# Patient Record
Sex: MALE | Race: WHITE | NOT HISPANIC OR LATINO | Employment: OTHER | URBAN - NONMETROPOLITAN AREA
[De-identification: names, ages, dates, MRNs, and addresses within clinical notes are randomized per-mention and may not be internally consistent; named-entity substitution may affect disease eponyms.]

---

## 2022-12-08 ENCOUNTER — OFFICE VISIT (OUTPATIENT)
Dept: CARDIAC SURGERY | Facility: CLINIC | Age: 62
End: 2022-12-08

## 2022-12-08 VITALS
DIASTOLIC BLOOD PRESSURE: 84 MMHG | OXYGEN SATURATION: 97 % | HEART RATE: 53 BPM | WEIGHT: 238.2 LBS | HEIGHT: 70 IN | BODY MASS INDEX: 34.1 KG/M2 | SYSTOLIC BLOOD PRESSURE: 132 MMHG

## 2022-12-08 DIAGNOSIS — C34.31 MALIGNANT NEOPLASM OF LOWER LOBE OF RIGHT LUNG: Primary | ICD-10-CM

## 2022-12-08 PROCEDURE — 99205 OFFICE O/P NEW HI 60 MIN: CPT | Performed by: SURGERY

## 2022-12-08 RX ORDER — SPIRONOLACTONE 100 MG/1
100 TABLET, FILM COATED ORAL DAILY
COMMUNITY
Start: 2022-11-30

## 2022-12-08 RX ORDER — CALCIUM CARBONATE/VITAMIN D3 600 MG-10
TABLET ORAL
COMMUNITY

## 2022-12-08 RX ORDER — FAMOTIDINE 40 MG/1
40 TABLET, FILM COATED ORAL
COMMUNITY
Start: 2022-10-04

## 2022-12-08 RX ORDER — BACLOFEN 10 MG/1
10 TABLET ORAL
COMMUNITY
Start: 2022-09-28

## 2022-12-08 RX ORDER — LIDOCAINE 50 MG/G
PATCH TOPICAL
COMMUNITY
Start: 2022-10-08

## 2022-12-08 RX ORDER — LACTULOSE 10 G/15ML
SOLUTION ORAL
COMMUNITY
Start: 2022-11-01

## 2022-12-08 RX ORDER — OXYCODONE HYDROCHLORIDE 5 MG/1
TABLET ORAL
COMMUNITY
Start: 2022-12-02

## 2022-12-08 RX ORDER — METOPROLOL SUCCINATE 25 MG/1
TABLET, EXTENDED RELEASE ORAL
COMMUNITY

## 2022-12-08 RX ORDER — LIDOCAINE 50 MG/G
OINTMENT TOPICAL
COMMUNITY

## 2022-12-08 RX ORDER — FUROSEMIDE 20 MG/1
20 TABLET ORAL DAILY PRN
COMMUNITY
Start: 2022-11-16

## 2022-12-08 NOTE — PROGRESS NOTES
Thoracic Surgery Consultation    Referring Physician: Dr. Chuy Calhoun    Primary Care Physician: Dr. Henny Colon    Chief Complaint   Patient presents with   • Malignant Neoplasm of Lower Lobe, Right Bronchus or Lung     New patient referred from Dr. Calhoun         Subjective     History of Present Illness  Mr. Smith is a 62-year-old male who presents to me with right lower lobe lung mass, adenocarcinoma.  He has a history of cirrhosis hepatitis C that was treated 7 years ago.  He was hospitalized with sepsis and diagnosed with cirrhosis at that time.  He also has splenomegaly.  He recently had some recurrent pneumonia with chest x-rays that were abnormal and was told he needed to follow-up on this.  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  It was felt that the tricuspid valve could be contributing to congestive hepatopathy.  With this he underwent work-up for surgical resection of the mass at [facility].  He underwent EBUS I do not have those records but from the family was told his lymph nodes were negative.  He also underwent PFTs there which were adequate for lung resection.  He was scheduled to have surgery and on the day of surgery his platelets were too low for operation and they have not heard for follow-up since.  With this is an oncologist at [place], Dr. Calhoun, referred him to me for consideration of surgical resection of this right lower lobe lung cancer.  Since his admission 7 years ago with cirrhosis sepsis, he has been admitted 1 other time for lower extremity cellulitis.  He quit drinking 13 years ago.  He does not smoke.  He has never had cancer prior to this.  He has never had surgery on his heart lungs or chest prior to this.  He sees Dr. Ng in [place] and follows with him from a GI perspective.        Review of Systems    Constitutional: Positive for fatigue. Negative for activity change.   Respiratory: Negative for chest tightness and shortness of breath.    Cardiovascular: Positive for leg swelling. Negative for chest pain.        A complete review of systems was performed, is negative except stated above.    No past medical history on file.  No past surgical history on file.  No family history on file.  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     • Calcium Carb-Cholecalciferol 600-10 MG-MCG tablet Calcium 600 + D(3)   Take one daily     • famotidine (PEPCID) 40 MG tablet Take 40 mg by mouth every night at bedtime.     • furosemide (LASIX) 20 MG tablet Take 20 mg by mouth Daily As Needed.     • lactulose (CHRONULAC) 10 GM/15ML solution take THREE teaspoonfuls (15ml) BY MOUTH TWICE DAILY AS NEEDED, MUST MAKE APPOINTMENT     • lidocaine (LIDODERM) 5 % APPLY ONE PATCH TOPPICALLY TO DRY INTACT SKIN EVERY DAY, MAY WEAR UP TO 12 HOURS     • lidocaine (XYLOCAINE) 5 % ointment lidocaine 5 % topical ointment   apply TOPICALLY TO AFFECTED AREA ONCE TO FOUR TIMES DAILY AS NEEDED     • metoprolol succinate XL (TOPROL-XL) 25 MG 24 hr tablet metoprolol succinate ER 25 mg tablet,extended release 24 hr   Take 0.5 tablets every day by oral route at bedtime.     • oxyCODONE (ROXICODONE) 5 MG immediate release tablet TAKE ONE TABLET BY MOUTH EVERY FOUR TO SIX HOURS AS NEEDED FOR PAIN. DO NOT FILL TILL 22     • riFAXIMin (XIFAXAN) 550 MG tablet Xifaxan 550 mg  "tablet     • spironolactone (ALDACTONE) 100 MG tablet Take 100 mg by mouth Daily.     • Vitamin A 3 MG (29952 UT) capsule vitamin A 10,000 unit capsule   TAKE ONE CAPSULE BY MOUTH EVERY DAY       No current facility-administered medications for this visit.     Allergies:  Capzasin [capsaicin]    Objective      Vital Signs  Visit Vitals  /84 (BP Location: Right arm, Patient Position: Sitting, Cuff Size: Adult)   Pulse 53   Ht 177.8 cm (70\")   Wt 108 kg (238 lb 3.2 oz)   SpO2 97%   BMI 34.18 kg/m²         Physical Exam  Constitutional:       General: He is not in acute distress.     Appearance: He is well-developed. He is obese. He is not diaphoretic.   HENT:      Head: Normocephalic and atraumatic.      Right Ear: External ear normal.      Left Ear: External ear normal.   Eyes:      General:         Right eye: No discharge.         Left eye: No discharge.      Pupils: Pupils are equal, round, and reactive to light.   Neck:      Vascular: No JVD.      Trachea: No tracheal deviation.   Cardiovascular:      Rate and Rhythm: Normal rate and regular rhythm.      Heart sounds: Normal heart sounds. No murmur heard.  Pulmonary:      Effort: Pulmonary effort is normal. No respiratory distress.      Breath sounds: Normal breath sounds. No stridor. No wheezing.   Abdominal:      General: There is no distension.      Palpations: Abdomen is soft.      Tenderness: There is no abdominal tenderness. There is no guarding.      Comments: Cannot feel liver edge, obese no obvious fluid wave   Musculoskeletal:         General: No tenderness or deformity. Normal range of motion.      Cervical back: Normal range of motion and neck supple.   Skin:     General: Skin is warm and dry.      Capillary Refill: Capillary refill takes less than 2 seconds.      Coloration: Skin is not pale.      Findings: No erythema or rash.   Neurological:      Mental Status: He is alert and oriented to person, place, and time.      Motor: No abnormal muscle " tone.      Coordination: Coordination normal.   Psychiatric:         Behavior: Behavior normal.         Thought Content: Thought content normal.         Judgment: Judgment normal.         Results Review:     No results found for: WBC, RBC, HGB, HCT, MCV, MCH, MCHC, RDW, RDWSD, MPV, PLT, NEUTRORELPCT, LYMPHORELPCT, MONORELPCT, EOSRELPCT, BASORELPCT, AUTOIGPER, NEUTROABS, LYMPHSABS, MONOSABS, EOSABS, BASOSABS, AUTOIGNUM, NRBC  No results found for: GLUCOSE, NA, K, CO2, CL, ANIONGAP, CREATININE, BUN, BCR, CALCIUM, EGFRIFNONA, ALKPHOS, PROTEINTOT, ALT, AST, BILITOT, ALBUMIN, GLOB, LABIL2     I reviewed the patient's clinical results and discussed with patient.      PET Scan:      MRI Brain:      I personally reviewed images of following exams, the following is my interpretation:    PET Scan: 5.3 cm right lower lobe lung mass along the diaphragmatic surface, multiple subcentimeter nodules also present all in the right lower lobe consistent with intralobar metastasis, no enlarged mediastinal or hilar lymph nodes          Assessment & Plan     Mr. Smith is a 62-year-old male who presents with a right lower lobe lung cancer.  I do not have the reports of the biopsy but per the family report biopsies consistent with adenocarcinoma.  He does have comorbidities of hepatitis and alcohol induced cirrhosis with splenomegaly.  He had undergone work-up and was set to undergo surgical resection of the right lower lobe at [facility] in [place] but his surgery was canceled due to thrombocytopenia.  He was referred to me for second opinion with regard to surgical resection of his lung cancer.    I discussed with Mr. Smith and his wife today the natural history of lung cancer such as his as well as treatment options.  We discussed the need to further review records of EBUS from [facility], biopsy from [facility], PFTs from [facility].  We also discussed difficulty in treating surgically in the setting of cirrhosis  splenomegaly and thrombocytopenia.  Patient could be optimized and still possibly be a surgical candidate.  I also discussed with him neoadjuvant chemotherapy with immunotherapy, the Checkmate protocol, that was recently approved for stage Ib-IIIa non-small cell lung cancers in a preoperative fashion.  I am not sure if his liver disease would preclude him from this but if not I would like to see if this is a possibility.  If this is not possibility, we can discuss proceeding with high risk right lower lobectomy and lymph node sampling.  I did discuss with Dr. Calhoun who is in agreement with CheckMate protocol preop and then reevaluate for possible surgical resection.    Clinical Stage of Malignancy: T3N0M0, Stage IIB    We will plan on neoadjuvant immunotherapy and chemotherapy per Checkmate protocol and then see me back for reconsideration of surgical resection.  If he is not a candidate for surgical resection at that time we can consider definitive radiation.  Thank you for trusting me with the care of Mr. Smith.  Please do not hesitate to call with any questions or concerns.    Pete Valdez MD  Cardiothoracic Surgeon    I spent a total of 64 minutes today before during and after visit reviewing records educating the patient about their disease and ordering test.

## 2022-12-09 ENCOUNTER — TELEPHONE (OUTPATIENT)
Dept: CARDIAC SURGERY | Facility: CLINIC | Age: 62
End: 2022-12-09

## 2022-12-09 NOTE — TELEPHONE ENCOUNTER
Per Dr. Valdez's request, faxed request for records for PFT and EBUS to [facility]. Received today. Filed records in chart.    Faxed a third request (faxed request 3 times now) to [facility] for report on CT Guided Needle Biopsy performed in August 2022. Still awaiting this report.

## 2022-12-13 ENCOUNTER — TELEPHONE (OUTPATIENT)
Dept: CARDIAC SURGERY | Facility: CLINIC | Age: 62
End: 2022-12-13

## 2022-12-13 NOTE — TELEPHONE ENCOUNTER
"Wife calling.  She is upset that they have not heard back from our office yet.  States pt was seen by Dr aVldez last week and he was going to meet with \"a panel\" yesterday re: pt.  She knows that was just yesterday but she is concerned about pt having lung cancer and is requesting a call back ASAP.  Informed her that Dr Valdez has been in surgery all morning but that I would put a message to him to review this afternoon.  Can reach her at #[phone]/eliana  "

## 2022-12-13 NOTE — TELEPHONE ENCOUNTER
"Pt's son, Prince Smith III, is calling stating that there was a lot of information \"dumped\" on his mother and she is confused and does not know what everything means and pt's son (listed on verbal release) would like to speak with Dr. José Miguel ARCE regarding pt's diagnosis and treatment going forward. Prince Smith III can be reached at [phone].  "

## 2023-01-05 PROBLEM — Z87.891 FORMER SMOKER: Status: ACTIVE | Noted: 2023-01-05

## 2023-01-06 ENCOUNTER — CONSULT (OUTPATIENT)
Dept: RADIATION ONCOLOGY | Facility: HOSPITAL | Age: 63
End: 2023-01-06
Payer: MEDICARE

## 2023-01-06 ENCOUNTER — HOSPITAL ENCOUNTER (OUTPATIENT)
Dept: RADIATION ONCOLOGY | Facility: HOSPITAL | Age: 63
Setting detail: RADIATION/ONCOLOGY SERIES
End: 2023-01-06
Payer: MEDICARE

## 2023-01-06 VITALS
SYSTOLIC BLOOD PRESSURE: 144 MMHG | HEIGHT: 70 IN | OXYGEN SATURATION: 97 % | BODY MASS INDEX: 33.28 KG/M2 | DIASTOLIC BLOOD PRESSURE: 62 MMHG | WEIGHT: 232.5 LBS | HEART RATE: 59 BPM

## 2023-01-06 DIAGNOSIS — Z87.891 FORMER SMOKER: ICD-10-CM

## 2023-01-06 DIAGNOSIS — C34.31 MALIGNANT NEOPLASM OF LOWER LOBE OF RIGHT LUNG: Primary | ICD-10-CM

## 2023-01-06 PROCEDURE — G0463 HOSPITAL OUTPT CLINIC VISIT: HCPCS | Performed by: RADIOLOGY

## 2023-01-06 NOTE — PROGRESS NOTES
[facility]a Willard MD      ARRON Anderson  ____________________________________________________________               [facility]  Department of Radiation Oncology  [address] [place] [id number]  Office:  [phone]  Fax: [phone]    DATE: 01/06/2023  PATIENT: Prince Smith 1960   Medical record #: [id number]                                                       REASON FOR CONSULTATION:   Chief Complaint   Patient presents with   • Lung Cancer     Prince Smith is a 62 y.o. male that has been referred to our clinic to be evaluated for consideration pulmonary SBRT for newly diagnosed right lower lobe lung adenocarcinoma. Denies activity change, appetite change, unexpected weight change, nasuea/vomiting, diarrhea, light-headedness, weakness, and headaches. He follows  and .            HISTORY OF PRESENT ILLNESS  Patient was initially undergoing work-up regarding cardiac issues and was incidentally found to have a right lower lobe mass.    08/26/2022 - CT-Guided Needle Biopsy Right Lung Mass:  • Invasive well-differentiated adenocarcinoma with acinar and papillary patterns.   Comment: Immunohistochemical stains on block 1B demonstrate the tumor cells to be immunoreactive for cytokeratin 7 and TTF1, without significant immunoreactivity for cytokeratin 20, CDX2 or p40 with appropriate controls.  The morphologic and immunohistochemical staining features are most consistent with a primary adenocarcinoma of bronchopulmonary origin.       08/26/2022 - PET Scan - [facility]:  • Lobular mass right lower lobe medially with 3 additional tiny satellite nodules present.  The mass demonstrates low-grade uptake and is cavitary centrally.  This appearance may be infectious, related to granulomatous disease/TB, or neoplastic.  CT-guided biopsy would be recommended.   • No evidence of disease elsewhere.      09/16/2022 -  Bronchoscopy/EBUS - [facility]:  • FNA Level 7 Lymph node:  o Specimen processed and examined but nondiagnostic due to insufficient cellularity    11/10/2022 - PET Scan - [facility]:  • 5.3 x 3.8 cm right lower lobe pulmonary mass most consistent with malignancy. Bronchoscopy or percutaneous biopsy is suggested for pathological diagnosis.  • Scattered subcentimeter pulmonary nodules in the right lower lobe which are concerning for metastatic disease although these are too small to characterize. Recommend attention on follow-up studies.  • Severe splenomegaly.  • Otherwise unremarkable PET scan with no evidence of hypermetabolic thoracic adenopathy or metastatic disease in the abdomen or pelvis.    11/10/2022 - MRI Brain with and without contrast:  • Negative for age.    12/08/2022 - Appointment with :  • Mr. Smith is a 62-year-old male who presents with a right lower lobe lung cancer.  I do not have the reports of the biopsy but per the family report biopsies consistent with adenocarcinoma.  He does have comorbidities of hepatitis and alcohol induced cirrhosis with splenomegaly.  He had undergone work-up and was set to undergo surgical resection of the right lower lobe at [facility] in [place] but his surgery was canceled due to thrombocytopenia.  He was referred to me for second opinion with regard to surgical resection of his lung cancer.  • I discussed with Mr. Smith and his wife today the natural history of lung cancer such as his as well as treatment options.  We discussed the need to further review records of EBUS from [facility], biopsy from [facility], PFTs from [facility].  We also discussed difficulty in treating surgically in the setting of cirrhosis splenomegaly and thrombocytopenia.  Patient could be optimized and still possibly be a surgical candidate.  I also discussed with him neoadjuvant chemotherapy with immunotherapy, the Checkmate protocol, that was recently approved for  stage Ib-IIIa non-small cell lung cancers in a preoperative fashion.  I am not sure if his liver disease would preclude him from this but if not I would like to see if this is a possibility.  If this is not possibility, we can discuss proceeding with high risk right lower lobectomy and lymph node sampling.  I did discuss with Dr. Calhoun who is in agreement with CheckMate protocol preop and then reevaluate for possible surgical resection.  • Clinical Stage of Malignancy: T3N0M0, Stage IIB  • We will plan on neoadjuvant immunotherapy and chemotherapy per Checkmate protocol and then see me back for reconsideration of surgical resection.  If he is not a candidate for surgical resection at that time we can consider definitive radiation.      12/14/2022 - Appointment with :  • I had a long discussion with the patient today about the nature of his disease, prognosis, treatment options available, risks and benefits of treatment. I also discussed the case personally with his cardiothoracic surgeon.   At this point in time his cardiothoracic surgeon prefer to do neoadjuvant chemoimmunotherapy. He was very concerned about That the patient is very high risk for surgery with his liver cirrhosis and thrombocytopenia. His recommendations were to proceed with neoadjuvant treatment followed by surgical intervention if possible.   • I had a long discussion with the patient about the above recommendations today and I discussed with him to start him on carboplatin, Taxol and nivolumab or carboplatin and Taxol along given the high risk and low platelet count and liver disease. I discussed with the patient the pros and cons risks and benefits of the above treatment in details. At this point in time the patient said he was not interested in systemic therapy as he was concerned about his quality of life and the side effects.   • The patient and his family ask about if he does not do anything and he just proceed with observation  and I explained to them that his malignancy will definitely get worse and I recommend intervention.   Given the fact that the patient is not interested at this point in time to proceed with systemic therapy and he is very hesitant to go back to see the surgeon I would like him to see our radiation oncologist to see if he would benefit from radiation treatment. I discussed this with him today and he said he will think about it and he will make a decision after he see the radiation oncologist.   • We sent [employer] testing and PD-L1 testing and the results are pending.    History obtained from  PATIENT, FAMILY, and CHART    PAST MEDICAL HISTORY  Past Medical History:   Diagnosis Date   • Diabetes mellitus (HCC)    • Lung cancer (HCC)    • Neuropathy       PAST SURGICAL HISTORY  Past Surgical History:   Procedure Laterality Date   • BRONCHOSCOPY  2022   • LEG SURGERY Left    • LUNG BIOPSY  2022    [facility]      PAST ONCOLOGIC HISTORY: Patient denies previous history of malignancies, chemotherapy or radiation therapy.  He denies knowledge of vascular/connective tissue disorders or active rheumatologic disease.    FAMILY HISTORY  family history is not on file.     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     • Calcium Carb-Cholecalciferol 600-10 MG-MCG tablet Calcium 600 + D(3)   Take one daily     • famotidine (PEPCID) 40 MG tablet Take 40 mg by mouth every night at  bedtime.     • furosemide (LASIX) 20 MG tablet Take 20 mg by mouth Daily As Needed.     • lactulose (CHRONULAC) 10 GM/15ML solution take THREE teaspoonfuls (15ml) BY MOUTH TWICE DAILY AS NEEDED, MUST MAKE APPOINTMENT     • lidocaine (LIDODERM) 5 % APPLY ONE PATCH TOPPICALLY TO DRY INTACT SKIN EVERY DAY, MAY WEAR UP TO 12 HOURS     • lidocaine (XYLOCAINE) 5 % ointment lidocaine 5 % topical ointment   apply TOPICALLY TO AFFECTED AREA ONCE TO FOUR TIMES DAILY AS NEEDED     • metoprolol succinate XL (TOPROL-XL) 25 MG 24 hr tablet metoprolol succinate ER 25 mg tablet,extended release 24 hr   Take 0.5 tablets every day by oral route at bedtime.     • oxyCODONE (ROXICODONE) 5 MG immediate release tablet TAKE ONE TABLET BY MOUTH EVERY FOUR TO SIX HOURS AS NEEDED FOR PAIN. DO NOT FILL TILL 12-2-22     • riFAXIMin (XIFAXAN) 550 MG tablet Xifaxan 550 mg tablet     • spironolactone (ALDACTONE) 100 MG tablet Take 100 mg by mouth Daily.     • Vitamin A 3 MG (63403 UT) capsule vitamin A 10,000 unit capsule   TAKE ONE CAPSULE BY MOUTH EVERY DAY       No current facility-administered medications for this visit.     The following portions of the patient's history were reviewed and updated as appropriate: allergies, current medications, past family history, past medical history, past social history, past surgical history and problem list.    REVIEW OF SYSTEMS  Review of Systems   Constitutional: Negative.    HENT:  Negative.    Eyes: Negative.    Respiratory: Negative.    Cardiovascular: Negative.    Gastrointestinal: Negative.    Endocrine: Negative.    Genitourinary: Negative.     Musculoskeletal: Negative.    Skin: Negative.    Neurological: Negative.    Hematological: Negative.    Psychiatric/Behavioral: Negative.      I have reviewed and confirmed the accuracy of the ROS as documented by the MA/LPN/TORI Guardado MD    PHYSICAL EXAM  VITAL SIGNS:   Vitals:    01/06/23 1023   BP: 144/62   Pulse: 59   SpO2: 97%  Comment:  room air   Weight: 105 kg (232 lb 8 oz)   Height: 177.8 cm (70\")   PainSc:   4   PainLoc: Foot  Comment: bilateral; neuropathy; taking meds      Physical Exam  Constitutional:       Appearance: Normal appearance. He is obese.   HENT:      Head: Normocephalic and atraumatic.      Nose: Nose normal.      Mouth/Throat:      Mouth: Mucous membranes are moist.   Eyes:      Extraocular Movements: Extraocular movements intact.      Pupils: Pupils are equal, round, and reactive to light.   Cardiovascular:      Pulses: Normal pulses.   Pulmonary:      Effort: Pulmonary effort is normal.   Abdominal:      Palpations: Abdomen is soft.   Musculoskeletal:         General: Normal range of motion.      Cervical back: Normal range of motion and neck supple.   Skin:     General: Skin is warm and dry.   Neurological:      General: No focal deficit present.      Mental Status: He is alert and oriented to person, place, and time. Mental status is at baseline.   Psychiatric:         Mood and Affect: Mood normal.         Behavior: Behavior normal.         Thought Content: Thought content normal.         Performance Status: ECOG (0) Fully active, able to carry on all predisease performance without restriction    Clinical Quality Measures  -Pain Documented by Standardized Tool, FPS Princeedwin Smith reports a pain score of 4. Given his pain assessment as noted, treatment options were discussed and the following options were decided upon as a follow-up plan to address the patient's pain: continuation of current treatment plan for pain and use of non-medical modalities (ice, heat, stretching and/or behavior modifications).  Pain Medications             baclofen (LIORESAL) 10 MG tablet Take 10 mg by mouth every night at bedtime.    oxyCODONE (ROXICODONE) 5 MG immediate release tablet TAKE ONE TABLET BY MOUTH EVERY FOUR TO SIX HOURS AS NEEDED FOR PAIN. DO NOT FILL TILL 12-2-22        -Advanced Care Planning   Advance Care Planning   ACP  discussion was held with the patient during this visit. Patient does not have an advance directive, information provided.     -Body Mass Index Screening and Follow-Up Plan BMI is >= 30 and <35. (Class 1 Obesity). The following options were offered after discussion;: weight loss educational material (shared in after visit summary)    -Tobacco Use: Screening and Cessation Intervention Social History    Tobacco Use      Smoking status: Former        Packs/day: 1.00        Years: 28.00        Pack years: 28        Types: Cigarettes        Start date:         Quit date:         Years since quittin.0      Smokeless tobacco: Never     ASSESSMENT AND PLAN  1. Malignant neoplasm of lower lobe of right lung (HCC)    2. Former smoker      No orders of the defined types were placed in this encounter.    RECOMMENDATIONS: Prince Smith was diagnosed with stage IIB versus IIIA (T3/T4 cN0 cM0) right lower lobe lung adenocarcinoma diagnosed during work-up of incidentally noted pulmonary mass during cardiac work-up.  Due to comorbidities including liver cirrhosis, splenomegaly/thrombocytopenia patient was deemed a poor surgical candidate.  He has established care with Dr. Calhoun (medical oncology) but appears to be declining systemic therapy options.  Patient was seen by Dr. Shaver (radiation oncologist) who referred patient here for consideration of pulmonary SBRT candidacy.    The indications and rationale of lung stereotactic/SBRT-based radiation therapy according to the NCCN Guidelines has been discussed today. I have extensively reviewed the risks, benefits and alternatives of therapy with this diagnosis. The risks of radiation therapy includes but is not limited to radiation induced pulmonary fibrosis, progression of disease in spite of therapy with either local or systemic failure. I have seen, examined and reviewed this patient's medication list, appropriate labs and imaging studies as well as other  physician notes. We discussed the goals and plans of care with the patient and family and answered all questions.      During patient's visit, only radiology reports were available for review but without images.  Based on initial reports describing a greater than 5 cm right lower lobe mass with scattered satellite lesions suggestive of intra lobar metastasis, I informed him and his wife that he would not be an appropriate candidate for pulmonary SBRT given size/multifocality of his disease presentation.  Subsequent to this visit on the same day, the disc with images arrived at our department and I reviewed the 11/10/2022 PET CT scan which confirmed a large dominant right lower lobe mass located medially and intimately abutting the thoracic spine without apparent invasion.  I did count approximately 7 satellite nodules, 1 of which appeared to be outside of the right lower lobe adjacent to perihilar structures, ipsilaterally.  I called Mr. Smith to inform him and his wife that after review of these images, my initial impression was confirmed for him not being an appropriate candidate for pulmonary SBRT.  I did recommend that he consider concomitant systemic therapy and thoracic radiotherapy.  However if he declines all systemic therapy, radiotherapy alone can be pursued however at a cost of likely significantly inferior outcomes. Dr. Shaver may consider altered hypofractionated treatment courses as single modality treatment in such situation.    Patient stated that if he opted for radiotherapy treatment, he would pursue this at [facility] with Dr. Shaver.  He also offered that he may consider going to [place] to seek treatment there. Patient can follow-up with this department on an as-needed basis.    Continue ongoing management per primary care physician and other specialists. Thank you for allowing me to assist in this patients care.     There are no Patient Instructions on file for this visit.    Time Spent: I  spent 60 minutes caring for Prince on this date of service. This time includes time spent by me in the following activities: preparing for the visit, reviewing tests, obtaining and/or reviewing a separately obtained history, performing a medically appropriate examination and/or evaluation, counseling and educating the patient/family/caregiver, ordering medications, tests, or procedures, referring and communicating with other health care professionals, documenting information in the medical record, independently interpreting results and communicating that information with the patient/family/caregiver and care coordination.   Poncho Guardado MD  01/06/2023

## 2023-01-09 ENCOUNTER — TELEPHONE (OUTPATIENT)
Dept: RADIATION ONCOLOGY | Facility: HOSPITAL | Age: 63
End: 2023-01-09
Payer: MEDICARE

## 2023-01-12 ENCOUNTER — PATIENT ROUNDING (BHMG ONLY) (OUTPATIENT)
Dept: CARDIAC SURGERY | Facility: CLINIC | Age: 63
End: 2023-01-12
Payer: MEDICARE

## 2023-01-12 NOTE — PROGRESS NOTES
A [email or web address] message has been sent to the patient for PATIENT ROUNDING with [facility].